# Patient Record
Sex: MALE | Race: WHITE | Employment: OTHER | URBAN - METROPOLITAN AREA
[De-identification: names, ages, dates, MRNs, and addresses within clinical notes are randomized per-mention and may not be internally consistent; named-entity substitution may affect disease eponyms.]

---

## 2019-06-10 NOTE — PROCEDURE NOTE: CLINICAL
PROCEDURE NOTE: Bandage Contact Lens OD. Diagnosis: Corneal Abrasion. Anesthesia: Lidocaine 4%. Prior to treatment, the risks/benefits/alternatives were discussed. The patient wished to proceed with procedure. A topical anesthetic, proparacaine, was administered. A bandage contact lens was fitted for treatment of ocular surface disease. Night and day contact lens (-0.00, 13.8, 8.6), fitting and placement done without complication. Expiration date: *. Lot #: *. Patient tolerated procedure well. There were no complications. Post procedure instructions given. The patient tolerated the procedure well and left in good condition. Patient is instructed to make sure it is removed in 24 hours, either here, at home, or by another eye doctor. Regla Ace

## 2021-01-11 ENCOUNTER — NEW PATIENT COMPREHENSIVE (OUTPATIENT)
Dept: URBAN - METROPOLITAN AREA CLINIC 43 | Facility: CLINIC | Age: 76
End: 2021-01-11

## 2021-01-11 DIAGNOSIS — H52.03: ICD-10-CM

## 2021-01-11 DIAGNOSIS — H52.203: ICD-10-CM

## 2021-01-11 PROCEDURE — 92004 COMPRE OPH EXAM NEW PT 1/>: CPT

## 2021-01-11 PROCEDURE — 92015 DETERMINE REFRACTIVE STATE: CPT

## 2021-01-11 ASSESSMENT — VISUAL ACUITY
OD_CC: J3
OD_SC: 20/80-1
OD_SC: J12-
OS_SC: 20/70-1
OD_CC: 20/25
OS_PH: 20/30
OS_SC: J12
OS_CC: 20/40
OS_CC: J3-

## 2021-01-11 ASSESSMENT — TONOMETRY
OS_IOP_MMHG: 18
OD_IOP_MMHG: 18

## 2022-10-31 ENCOUNTER — CONSULTATION/EVALUATION (OUTPATIENT)
Dept: URBAN - METROPOLITAN AREA CLINIC 39 | Facility: CLINIC | Age: 77
End: 2022-10-31

## 2022-10-31 DIAGNOSIS — H40.013: ICD-10-CM

## 2022-10-31 DIAGNOSIS — H35.30: ICD-10-CM

## 2022-10-31 DIAGNOSIS — H18.513: ICD-10-CM

## 2022-10-31 DIAGNOSIS — H04.123: ICD-10-CM

## 2022-10-31 DIAGNOSIS — H25.813: ICD-10-CM

## 2022-10-31 PROCEDURE — 92136TC50 INTERFEROMETRY - TECHNICAL COMPONENT

## 2022-10-31 PROCEDURE — V2799PMN IMPRIMIS PRED-MOXI-NEPAF 5ML

## 2022-10-31 PROCEDURE — 92286 ANT SGM IMG I&R SPECLR MIC: CPT

## 2022-10-31 PROCEDURE — 92025AV CORNEAL TOPOGRAPHY, AV

## 2022-10-31 PROCEDURE — 92014 COMPRE OPH EXAM EST PT 1/>: CPT

## 2022-10-31 PROCEDURE — 92134 CPTRZ OPH DX IMG PST SGM RTA: CPT

## 2022-10-31 ASSESSMENT — VISUAL ACUITY
OS_CC: 20/30-1
OS_CC: J2
OS_SC: J12
OS_AM: 20/20
OD_RAM: 20/20
OS_SC: 20/50-1
OD_SC: >J12
OS_BAT: 20/70
OD_SC: 20/100
OD_BAT: 20/70
OD_CC: 20/40
OD_CC: J2

## 2022-10-31 ASSESSMENT — TONOMETRY
OD_IOP_MMHG: 20
OS_IOP_MMHG: 20

## 2022-11-16 ENCOUNTER — PRE-OP/H&P (OUTPATIENT)
Dept: URBAN - METROPOLITAN AREA CLINIC 39 | Facility: CLINIC | Age: 77
End: 2022-11-16

## 2022-11-16 ENCOUNTER — POST-OP (OUTPATIENT)
Dept: URBAN - METROPOLITAN AREA CLINIC 39 | Facility: CLINIC | Age: 77
End: 2022-11-16

## 2022-11-16 ENCOUNTER — SURGERY/PROCEDURE (OUTPATIENT)
Dept: URBAN - METROPOLITAN AREA CLINIC 39 | Facility: CLINIC | Age: 77
End: 2022-11-16

## 2022-11-16 DIAGNOSIS — H35.30: ICD-10-CM

## 2022-11-16 DIAGNOSIS — Z96.1: ICD-10-CM

## 2022-11-16 DIAGNOSIS — H18.513: ICD-10-CM

## 2022-11-16 DIAGNOSIS — H25.813: ICD-10-CM

## 2022-11-16 DIAGNOSIS — H04.123: ICD-10-CM

## 2022-11-16 DIAGNOSIS — H40.013: ICD-10-CM

## 2022-11-16 PROCEDURE — 66999LNSR LENSAR LASER FOR CAT SX

## 2022-11-16 PROCEDURE — 6698454AV REMOVE CATARACT, INSERT ADVANCED LENS (SX ONLY)

## 2022-11-16 PROCEDURE — 99211T TECH SERVICE

## 2022-11-16 PROCEDURE — 65772LRI LRI DURING CAT SX

## 2022-11-16 ASSESSMENT — TONOMETRY
OD_IOP_MMHG: 12
OS_IOP_MMHG: 14

## 2022-11-16 ASSESSMENT — VISUAL ACUITY: OS_SC: 20/50

## 2022-11-22 ENCOUNTER — SURGERY/PROCEDURE (OUTPATIENT)
Dept: URBAN - METROPOLITAN AREA CLINIC 39 | Facility: CLINIC | Age: 77
End: 2022-11-22

## 2022-11-22 ENCOUNTER — POST-OP (OUTPATIENT)
Dept: URBAN - METROPOLITAN AREA CLINIC 39 | Facility: CLINIC | Age: 77
End: 2022-11-22

## 2022-11-22 ENCOUNTER — POST OP/EVAL OF SECOND EYE (OUTPATIENT)
Dept: URBAN - METROPOLITAN AREA CLINIC 39 | Facility: CLINIC | Age: 77
End: 2022-11-22

## 2022-11-22 DIAGNOSIS — H40.013: ICD-10-CM

## 2022-11-22 DIAGNOSIS — H25.811: ICD-10-CM

## 2022-11-22 DIAGNOSIS — H04.123: ICD-10-CM

## 2022-11-22 DIAGNOSIS — H18.513: ICD-10-CM

## 2022-11-22 DIAGNOSIS — H26.492: ICD-10-CM

## 2022-11-22 DIAGNOSIS — Z96.1: ICD-10-CM

## 2022-11-22 DIAGNOSIS — H35.30: ICD-10-CM

## 2022-11-22 PROCEDURE — 66999LNSR LENSAR LASER FOR CAT SX

## 2022-11-22 PROCEDURE — 92012 INTRM OPH EXAM EST PATIENT: CPT

## 2022-11-22 PROCEDURE — 99211T TECH SERVICE

## 2022-11-22 PROCEDURE — 6698454AV REMOVE CATARACT, INSERT ADVANCED LENS (SX ONLY)

## 2022-11-22 PROCEDURE — 65772LRI LRI DURING CAT SX

## 2022-11-22 ASSESSMENT — VISUAL ACUITY
OD_RAM: 20/20
OD_BAT: 20/70
OS_SC: J1
OD_SC: 20/100
OS: J2
OD_SC: 20/50
OS_SC: 20/25
OD_SC: <J12

## 2022-11-22 ASSESSMENT — TONOMETRY
OS_IOP_MMHG: 16
OD_IOP_MMHG: 22